# Patient Record
Sex: FEMALE | Race: ASIAN | Employment: FULL TIME | URBAN - METROPOLITAN AREA
[De-identification: names, ages, dates, MRNs, and addresses within clinical notes are randomized per-mention and may not be internally consistent; named-entity substitution may affect disease eponyms.]

---

## 2024-01-24 ENCOUNTER — OFFICE VISIT (OUTPATIENT)
Age: 39
End: 2024-01-24

## 2024-01-24 VITALS
SYSTOLIC BLOOD PRESSURE: 120 MMHG | TEMPERATURE: 100.1 F | RESPIRATION RATE: 14 BRPM | HEIGHT: 61 IN | HEART RATE: 128 BPM | OXYGEN SATURATION: 98 % | WEIGHT: 144.8 LBS | BODY MASS INDEX: 27.34 KG/M2 | DIASTOLIC BLOOD PRESSURE: 87 MMHG

## 2024-01-24 DIAGNOSIS — R68.89 FLU-LIKE SYMPTOMS: ICD-10-CM

## 2024-01-24 DIAGNOSIS — U07.1 COVID-19: Primary | ICD-10-CM

## 2024-01-24 LAB
INFLUENZA A ANTIGEN, POC: NEGATIVE
INFLUENZA B ANTIGEN, POC: NEGATIVE
Lab: ABNORMAL
QC PASS/FAIL: ABNORMAL
SARS-COV-2 RDRP RESP QL NAA+PROBE: POSITIVE
VALID INTERNAL CONTROL, POC: YES

## 2024-01-24 NOTE — PATIENT INSTRUCTIONS
Results for orders placed or performed in visit on 01/24/24   AMB POC INFLUENZA A  AND B REAL-TIME RT-PCR   Result Value Ref Range    Valid Internal Control, POC yes     Influenza A Antigen, POC Negative     Influenza B Antigen, POC Negative    POCT COVID-19 Rapid, NAAT   Result Value Ref Range    SARS-COV-2, RdRp gene Positive (A) Negative    Lot Number [id number]     QC Pass/Fail pass     Discussed treatment with Paxlovid (Nirmatrelvir /ritonavir) twice daily for 5 days; based on guidelines to initiate as soon as possible after COVID-19 diagnosis, and within 5 days of symptom onset. Would renal adjustment for EGFR of 30 -60. In the [place], symptomatic outpatients who are 50 and older or have a risk factor for severe disease are eligible for COVID-19-specific therapy according to Emergency Use Authorization (EUA) criteria. Among such patients, we treat those at particular risk of severe disease, based on advanced age, immune status, severity and number of comorbidities, and vaccination status. COVID-19 therapeutics are most beneficial when initiated within several days (?5 to 9 days) of onset of symptoms  and should not be delayed. Standard dosing for > 40 kg.     Discussed recommendation for Paxlovid with patient. If taking Statin medication, hold for 10 days.      Advised to continue with tylenol for fever and body aches,   may alternate with ibuprofen. Advised to continue to isolate for 5 days from onset of symptoms and until fever free for 24 hours. Patient should continue to wear a mask for an additional 5 days, total of 10 days from symptom onset.  Patient should drink plenty of fluids, to include some with electrolytes. Patient reassured that O2 saturation was stable at   SpO2 Readings from Last 1 Encounters:   01/24/24 98%          Follow up if symptoms persist or if symptoms worsen please present to the ED.    Indications for hospitalization - There are no fixed criteria for inpatient hospital

## 2024-01-24 NOTE — PROGRESS NOTES
exudate or posterior oropharyngeal erythema.   Eyes:      Conjunctiva/sclera: Conjunctivae normal.      Pupils: Pupils are equal, round, and reactive to light.   Cardiovascular:      Rate and Rhythm: Regular rhythm. Tachycardia present.      Heart sounds: No murmur heard.  Pulmonary:      Effort: Pulmonary effort is normal. No respiratory distress.      Breath sounds: Normal breath sounds. No wheezing or rhonchi.   Abdominal:      General: There is no distension.      Palpations: Abdomen is soft.      Tenderness: There is no abdominal tenderness. There is no guarding or rebound.   Musculoskeletal:      Cervical back: Normal range of motion and neck supple. No tenderness.      Right lower leg: No edema.      Left lower leg: No edema.   Lymphadenopathy:      Cervical: No cervical adenopathy.   Skin:     General: Skin is warm and dry.      Findings: No rash.   Neurological:      General: No focal deficit present.      Mental Status: She is alert and oriented to person, place, and time.   Psychiatric:         Mood and Affect: Mood normal.         Behavior: Behavior normal.          Results for orders placed or performed in visit on 01/24/24   AMB POC INFLUENZA A  AND B REAL-TIME RT-PCR   Result Value Ref Range    Valid Internal Control, POC yes     Influenza A Antigen, POC Negative     Influenza B Antigen, POC Negative    POCT COVID-19 Rapid, NAAT   Result Value Ref Range    SARS-COV-2, RdRp gene Positive (A) Negative    Lot Number [id number]     QC Pass/Fail pass      The patients condition was discussed with the patient and they understand.  The patient is to follow up with primary care doctor.  If signs and symptoms become worse the pt is to go to the ER. The patient is to take medications as prescribed.     An electronic signature was used to authenticate this note.  Simran Clements PA-C

## 2024-01-26 ASSESSMENT — ENCOUNTER SYMPTOMS
SINUS PAIN: 0
BACK PAIN: 0
ABDOMINAL PAIN: 0
RHINORRHEA: 0
CHEST TIGHTNESS: 0
SINUS PRESSURE: 0
STRIDOR: 0
COUGH: 1
ABDOMINAL DISTENTION: 0
WHEEZING: 0

## 2024-02-17 ENCOUNTER — HOSPITAL ENCOUNTER (EMERGENCY)
Facility: HOSPITAL | Age: 39
Discharge: HOME | End: 2024-02-17
Attending: EMERGENCY MEDICINE
Payer: COMMERCIAL

## 2024-02-17 ENCOUNTER — APPOINTMENT (OUTPATIENT)
Facility: HOSPITAL | Age: 39
End: 2024-02-17
Payer: COMMERCIAL

## 2024-02-17 VITALS
HEIGHT: 61 IN | HEART RATE: 98 BPM | TEMPERATURE: 97.6 F | DIASTOLIC BLOOD PRESSURE: 92 MMHG | RESPIRATION RATE: 18 BRPM | BODY MASS INDEX: 30.09 KG/M2 | SYSTOLIC BLOOD PRESSURE: 121 MMHG | WEIGHT: 159.39 LBS | OXYGEN SATURATION: 100 %

## 2024-02-17 DIAGNOSIS — R10.31 RIGHT LOWER QUADRANT ABDOMINAL PAIN: Primary | ICD-10-CM

## 2024-02-17 LAB
ALBUMIN SERPL-MCNC: 3.5 G/DL (ref 3.5–5)
ALBUMIN/GLOB SERPL: 0.9 (ref 1.1–2.2)
ALP SERPL-CCNC: 62 U/L (ref 45–117)
ALT SERPL-CCNC: 24 U/L (ref 12–78)
ANION GAP SERPL CALC-SCNC: 10 MMOL/L (ref 5–15)
APPEARANCE UR: CLEAR
AST SERPL-CCNC: 17 U/L (ref 15–37)
BACTERIA URNS QL MICRO: NEGATIVE /HPF
BASOPHILS # BLD: 0 K/UL (ref 0–0.1)
BASOPHILS NFR BLD: 1 % (ref 0–1)
BILIRUB SERPL-MCNC: 0.4 MG/DL (ref 0.2–1)
BILIRUB UR QL: NEGATIVE
BUN SERPL-MCNC: 13 MG/DL (ref 6–20)
BUN/CREAT SERPL: 18 (ref 12–20)
CALCIUM SERPL-MCNC: 8.4 MG/DL (ref 8.5–10.1)
CHLORIDE SERPL-SCNC: 106 MMOL/L (ref 97–108)
CO2 SERPL-SCNC: 26 MMOL/L (ref 21–32)
COLOR UR: NORMAL
CREAT SERPL-MCNC: 0.74 MG/DL (ref 0.55–1.02)
DIFFERENTIAL METHOD BLD: ABNORMAL
EOSINOPHIL # BLD: 0.1 K/UL (ref 0–0.4)
EOSINOPHIL NFR BLD: 4 % (ref 0–7)
EPITH CASTS URNS QL MICRO: NORMAL /LPF
ERYTHROCYTE [DISTWIDTH] IN BLOOD BY AUTOMATED COUNT: 13.1 % (ref 11.5–14.5)
GLOBULIN SER CALC-MCNC: 3.9 G/DL (ref 2–4)
GLUCOSE SERPL-MCNC: 97 MG/DL (ref 65–100)
GLUCOSE UR STRIP.AUTO-MCNC: NEGATIVE MG/DL
HCG UR QL: NEGATIVE
HCT VFR BLD AUTO: 37.4 % (ref 35–47)
HGB BLD-MCNC: 12.9 G/DL (ref 11.5–16)
HGB UR QL STRIP: NEGATIVE
IMM GRANULOCYTES # BLD AUTO: 0 K/UL (ref 0–0.04)
IMM GRANULOCYTES NFR BLD AUTO: 0 % (ref 0–0.5)
KETONES UR QL STRIP.AUTO: NEGATIVE MG/DL
LEUKOCYTE ESTERASE UR QL STRIP.AUTO: NEGATIVE
LIPASE SERPL-CCNC: 45 U/L (ref 13–75)
LYMPHOCYTES # BLD: 1.4 K/UL (ref 0.8–3.5)
LYMPHOCYTES NFR BLD: 37 % (ref 12–49)
MCH RBC QN AUTO: 30 PG (ref 26–34)
MCHC RBC AUTO-ENTMCNC: 34.5 G/DL (ref 30–36.5)
MCV RBC AUTO: 87 FL (ref 80–99)
MONOCYTES # BLD: 0.4 K/UL (ref 0–1)
MONOCYTES NFR BLD: 10 % (ref 5–13)
NEUTS SEG # BLD: 1.9 K/UL (ref 1.8–8)
NEUTS SEG NFR BLD: 48 % (ref 32–75)
NITRITE UR QL STRIP.AUTO: NEGATIVE
NRBC # BLD: 0 K/UL (ref 0–0.01)
NRBC BLD-RTO: 0 PER 100 WBC
PH UR STRIP: 6.5 (ref 5–8)
PLATELET # BLD AUTO: 127 K/UL (ref 150–400)
PMV BLD AUTO: 12.1 FL (ref 8.9–12.9)
POTASSIUM SERPL-SCNC: 3.5 MMOL/L (ref 3.5–5.1)
PROT SERPL-MCNC: 7.4 G/DL (ref 6.4–8.2)
PROT UR STRIP-MCNC: NEGATIVE MG/DL
RBC # BLD AUTO: 4.3 M/UL (ref 3.8–5.2)
RBC #/AREA URNS HPF: NORMAL /HPF (ref 0–5)
SODIUM SERPL-SCNC: 142 MMOL/L (ref 136–145)
SP GR UR REFRACTOMETRY: <1.005 (ref 1–1.03)
UROBILINOGEN UR QL STRIP.AUTO: 0.2 EU/DL (ref 0.2–1)
WBC # BLD AUTO: 3.9 K/UL (ref 3.6–11)
WBC URNS QL MICRO: NORMAL /HPF (ref 0–4)

## 2024-02-17 PROCEDURE — 99285 EMERGENCY DEPT VISIT HI MDM: CPT

## 2024-02-17 PROCEDURE — 81025 URINE PREGNANCY TEST: CPT

## 2024-02-17 PROCEDURE — 74177 CT ABD & PELVIS W/CONTRAST: CPT

## 2024-02-17 PROCEDURE — 6360000002 HC RX W HCPCS: Performed by: PHYSICIAN ASSISTANT

## 2024-02-17 PROCEDURE — 80053 COMPREHEN METABOLIC PANEL: CPT

## 2024-02-17 PROCEDURE — 85025 COMPLETE CBC W/AUTO DIFF WBC: CPT

## 2024-02-17 PROCEDURE — 83690 ASSAY OF LIPASE: CPT

## 2024-02-17 PROCEDURE — 6360000004 HC RX CONTRAST MEDICATION: Performed by: PHYSICIAN ASSISTANT

## 2024-02-17 PROCEDURE — 6370000000 HC RX 637 (ALT 250 FOR IP): Performed by: PHYSICIAN ASSISTANT

## 2024-02-17 PROCEDURE — 81001 URINALYSIS AUTO W/SCOPE: CPT

## 2024-02-17 RX ORDER — METHYLPREDNISOLONE 4 MG/1
16 TABLET ORAL
Status: COMPLETED | OUTPATIENT
Start: 2024-02-17 | End: 2024-02-17

## 2024-02-17 RX ORDER — DIPHENHYDRAMINE HCL 25 MG
25 CAPSULE ORAL
Status: COMPLETED | OUTPATIENT
Start: 2024-02-17 | End: 2024-02-17

## 2024-02-17 RX ORDER — DIPHENHYDRAMINE HYDROCHLORIDE 50 MG/ML
25 INJECTION INTRAMUSCULAR; INTRAVENOUS
Status: DISCONTINUED | OUTPATIENT
Start: 2024-02-17 | End: 2024-02-17

## 2024-02-17 RX ADMIN — IOPAMIDOL 100 ML: 755 INJECTION, SOLUTION INTRAVENOUS at 15:58

## 2024-02-17 RX ADMIN — METHYLPREDNISOLONE 16 MG: 4 TABLET ORAL at 16:21

## 2024-02-17 RX ADMIN — DIPHENHYDRAMINE HYDROCHLORIDE 25 MG: 25 CAPSULE ORAL at 16:17

## 2024-02-17 ASSESSMENT — PAIN SCALES - GENERAL: PAINLEVEL_OUTOF10: 0

## 2024-02-17 NOTE — DISCHARGE INSTRUCTIONS
Your evaluation was reassuring.  Follow-up with your regular doctor. Return for any new or worsening. Addie Ch-Cristhian, PA

## 2024-02-17 NOTE — ED PROVIDER NOTES
interpreted by the emergency physician with the below findings:        Interpretation per the Radiologist below, if available at the time of this note:    CT ABDOMEN PELVIS W IV CONTRAST Additional Contrast? None    (Results Pending)        LABS:  Labs Reviewed   CBC WITH AUTO DIFFERENTIAL   COMPREHENSIVE METABOLIC PANEL   LIPASE   EXTRA TUBES HOLD   URINALYSIS WITH MICROSCOPIC   POC PREGNANCY UR-QUAL       All other labs were within normal range or not returned as of this dictation.    EMERGENCY DEPARTMENT COURSE and DIFFERENTIAL DIAGNOSIS/MDM:   Vitals:    Vitals:    02/17/24 1430   BP: 117/88   Pulse: 98   Resp: 18   Temp: 97.6 °F (36.4 °C)   TempSrc: Oral   SpO2: 100%   Weight: 72.3 kg (159 lb 6.3 oz)   Height: 1.549 m (5' 1\")           Medical Decision Making  Amount and/or Complexity of Data Reviewed  Labs: ordered.  Radiology: ordered.    Risk  Prescription drug management.            REASSESSMENT      Upon returning from CT patient was noted to have urticarial lesions to her face.  She denies any associated dysphagia, shortness of breath or wheezing.  Discussed with patient that this is likely an allergic reaction from the contrast. YANNI Odonnell    Labs and imaging reviewed.  No acute findings.  Trace physiologic fluid noted in the pelvis.  Increased ovarian follicles noted. YANNI Odonnell        CONSULTS:  None    PROCEDURES:  Unless otherwise noted below, none     Procedures      FINAL IMPRESSION    No diagnosis found.      DISPOSITION/PLAN   DISPOSITION        PATIENT REFERRED TO:  No follow-up provider specified.    DISCHARGE MEDICATIONS:  New Prescriptions    No medications on file         (Please note that portions of this note were completed with a voice recognition program.  Efforts were made to edit the dictations but occasionally words are mis-transcribed.)    YANNI Odonnell (electronically signed)  Emergency Attending Physician / Physician Assistant / Nurse  Practitioner             Dima-Morrow, April C, PA  02/17/24 7513

## 2024-02-17 NOTE — ED NOTES
Patient called out to notify staff that she had hives on her face following CT with contrast. April, PA made aware. Airway patent, patient 100% on RA, and in no apparent distress.

## 2024-02-17 NOTE — ED TRIAGE NOTES
Pt c/o an episode of abd pain in her right abd that resolved after having a BM that day. Pt denies pain today, states that she wants to be checked.